# Patient Record
Sex: MALE | Race: WHITE | Employment: FULL TIME | URBAN - METROPOLITAN AREA
[De-identification: names, ages, dates, MRNs, and addresses within clinical notes are randomized per-mention and may not be internally consistent; named-entity substitution may affect disease eponyms.]

---

## 2018-11-12 ENCOUNTER — HOSPITAL ENCOUNTER (OUTPATIENT)
Dept: NON INVASIVE DIAGNOSTICS | Age: 43
Discharge: HOME | End: 2018-11-12
Payer: COMMERCIAL

## 2018-11-12 ENCOUNTER — HOSPITAL ENCOUNTER (OUTPATIENT)
Dept: NUCLEAR MEDICINE | Age: 43
Discharge: HOME | End: 2018-11-14
Payer: COMMERCIAL

## 2018-11-12 DIAGNOSIS — R06.02 BREATH SHORTNESS: ICD-10-CM

## 2018-11-12 LAB
LV EF: 61 %
LVEF MODALITY: NORMAL

## 2018-11-12 PROCEDURE — A9500 TC99M SESTAMIBI: HCPCS | Performed by: RADIOLOGY

## 2018-11-12 PROCEDURE — 3430000000 HC RX DIAGNOSTIC RADIOPHARMACEUTICAL: Performed by: RADIOLOGY

## 2018-11-12 PROCEDURE — 93016 CV STRESS TEST SUPVJ ONLY: CPT | Performed by: INTERNAL MEDICINE

## 2018-11-12 PROCEDURE — 93018 CV STRESS TEST I&R ONLY: CPT | Performed by: INTERNAL MEDICINE

## 2018-11-12 PROCEDURE — 78452 HT MUSCLE IMAGE SPECT MULT: CPT

## 2018-11-12 PROCEDURE — 93017 CV STRESS TEST TRACING ONLY: CPT

## 2018-11-12 RX ADMIN — Medication 10.7 MILLICURIE: at 08:28

## 2018-11-12 RX ADMIN — Medication 33 MILLICURIE: at 10:47

## 2018-11-12 NOTE — PROCEDURES
Exercise Nuclear Stress Test:    Cardiologist: Dr. Josey Jameson    Date: 11/12/2018    Indications for study: Chest pain    1. No chest pain  2. Exercise time: 10:30, MPHR: 86%, [facility] treadmill score: 10.5  3. No new arrhythmias  4. No EKG changes suggestive of stress induced ischemia  5.  Nuclear images pending    Aden Alicia MD  [facility]) Cardiology